# Patient Record
Sex: MALE | Race: WHITE | NOT HISPANIC OR LATINO | Employment: FULL TIME | URBAN - METROPOLITAN AREA
[De-identification: names, ages, dates, MRNs, and addresses within clinical notes are randomized per-mention and may not be internally consistent; named-entity substitution may affect disease eponyms.]

---

## 2020-12-23 ENCOUNTER — HISTORICAL (OUTPATIENT)
Dept: ADMINISTRATIVE | Facility: HOSPITAL | Age: 25
End: 2020-12-23

## 2020-12-23 LAB
ALBUMIN SERPL-MCNC: 4.8 G/DL (ref 4.1–5.2)
ALBUMIN/GLOB SERPL: 2.1 {RATIO} (ref 1.2–2.2)
ALP SERPL-CCNC: 72 IU/L (ref 39–117)
ALT SERPL-CCNC: 17 IU/L (ref 0–44)
AST SERPL-CCNC: 22 IU/L (ref 0–40)
BASOPHILS # BLD AUTO: 0.1 X10E3/UL (ref 0–0.2)
BASOPHILS NFR BLD AUTO: 2 %
BILIRUB SERPL-MCNC: 0.7 MG/DL (ref 0–1.2)
BUN SERPL-MCNC: 13 MG/DL (ref 6–20)
CALCIUM SERPL-MCNC: 9.7 MG/DL (ref 8.7–10.2)
CHLORIDE SERPL-SCNC: 103 MMOL/L (ref 96–106)
CHOLEST SERPL-MCNC: 135 MG/DL (ref 100–199)
CHOLEST/HDLC SERPL: 2.3 RATIO (ref 0–5)
CO2 SERPL-SCNC: 26 MMOL/L (ref 20–29)
CREAT SERPL-MCNC: 1.08 MG/DL (ref 0.76–1.27)
CREAT/UREA NIT SERPL: 12 (ref 9–20)
EOSINOPHIL # BLD AUTO: 0.2 X10E3/UL (ref 0–0.4)
EOSINOPHIL NFR BLD AUTO: 6 %
ERYTHROCYTE [DISTWIDTH] IN BLOOD BY AUTOMATED COUNT: 11.9 % (ref 11.6–15.4)
GLOBULIN SER-MCNC: 2.3 G/DL (ref 1.5–4.5)
GLUCOSE SERPL-MCNC: 87 MG/DL (ref 65–99)
HCT VFR BLD AUTO: 40.5 % (ref 37.5–51)
HDLC SERPL-MCNC: 59 MG/DL
HGB BLD-MCNC: 13.8 G/DL (ref 13–17.7)
LDLC SERPL CALC-MCNC: 69 MG/DL (ref 0–99)
LYMPHOCYTES # BLD AUTO: 1.4 X10E3/UL (ref 0.7–3.1)
LYMPHOCYTES NFR BLD AUTO: 42 %
MCH RBC QN AUTO: 29.2 PG (ref 26.6–33)
MCHC RBC AUTO-ENTMCNC: 34.1 G/DL (ref 31.5–35.7)
MCV RBC AUTO: 86 FL (ref 79–97)
MONOCYTES # BLD AUTO: 0.3 X10E3/UL (ref 0.1–0.9)
MONOCYTES NFR BLD AUTO: 8 %
NEUTROPHILS # BLD AUTO: 1.4 X10E3/UL (ref 1.4–7)
NEUTROPHILS NFR BLD AUTO: 42 %
PLATELET # BLD AUTO: 199 X10E3/UL (ref 150–450)
POTASSIUM SERPL-SCNC: 4.5 MMOL/L (ref 3.5–5.2)
PROT SERPL-MCNC: 7.1 G/DL (ref 6–8.5)
RBC # BLD AUTO: 4.72 X10(6)/MCL (ref 4.14–5.8)
SODIUM SERPL-SCNC: 142 MMOL/L (ref 134–144)
TRIGL SERPL-MCNC: 24 MG/DL (ref 0–149)
TSH SERPL-ACNC: 0.62 MIU/ML (ref 0.45–4.5)
VLDLC SERPL CALC-MCNC: 7 MG/DL (ref 5–40)
WBC # SPEC AUTO: 3.3 X10E3/UL (ref 3.4–10.8)

## 2021-12-22 ENCOUNTER — HISTORICAL (OUTPATIENT)
Dept: LAB | Facility: HOSPITAL | Age: 26
End: 2021-12-22

## 2021-12-22 LAB
ABS NEUT (OLG): 2.57 X10(3)/MCL (ref 2.1–9.2)
ALBUMIN SERPL-MCNC: 4.2 GM/DL (ref 3.5–5)
ALBUMIN/GLOB SERPL: 1.5 RATIO (ref 1.1–2)
ALP SERPL-CCNC: 72 UNIT/L (ref 40–150)
ALT SERPL-CCNC: 21 UNIT/L (ref 0–55)
AST SERPL-CCNC: 24 UNIT/L (ref 5–34)
BASOPHILS # BLD AUTO: 0 X10(3)/MCL (ref 0–0.2)
BASOPHILS NFR BLD AUTO: 1 %
BILIRUB SERPL-MCNC: 0.9 MG/DL
BILIRUBIN DIRECT+TOT PNL SERPL-MCNC: 0.4 MG/DL (ref 0–0.5)
BILIRUBIN DIRECT+TOT PNL SERPL-MCNC: 0.5 MG/DL (ref 0–0.8)
BUN SERPL-MCNC: 12.5 MG/DL (ref 8.9–20.6)
CALCIUM SERPL-MCNC: 9.8 MG/DL (ref 8.7–10.5)
CHLORIDE SERPL-SCNC: 103 MMOL/L (ref 98–107)
CHOLEST SERPL-MCNC: 159 MG/DL
CHOLEST/HDLC SERPL: 3 {RATIO} (ref 0–5)
CO2 SERPL-SCNC: 30 MMOL/L (ref 22–29)
CREAT SERPL-MCNC: 0.88 MG/DL (ref 0.73–1.18)
EOSINOPHIL # BLD AUTO: 0.5 X10(3)/MCL (ref 0–0.9)
EOSINOPHIL NFR BLD AUTO: 10 %
ERYTHROCYTE [DISTWIDTH] IN BLOOD BY AUTOMATED COUNT: 12.1 % (ref 11.5–17)
GLOBULIN SER-MCNC: 2.8 GM/DL (ref 2.4–3.5)
GLUCOSE SERPL-MCNC: 88 MG/DL (ref 74–100)
HCT VFR BLD AUTO: 43.7 % (ref 42–52)
HDLC SERPL-MCNC: 62 MG/DL (ref 35–60)
HGB BLD-MCNC: 14.5 GM/DL (ref 14–18)
IMM GRANULOCYTES # BLD AUTO: 0.01 % (ref 0–0.02)
IMM GRANULOCYTES NFR BLD AUTO: 0.2 % (ref 0–0.43)
LDLC SERPL CALC-MCNC: 87 MG/DL (ref 50–140)
LYMPHOCYTES # BLD AUTO: 1.7 X10(3)/MCL (ref 0.6–4.6)
LYMPHOCYTES NFR BLD AUTO: 33 %
MCH RBC QN AUTO: 28.6 PG (ref 27–31)
MCHC RBC AUTO-ENTMCNC: 33.2 GM/DL (ref 33–36)
MCV RBC AUTO: 86.2 FL (ref 80–94)
MONOCYTES # BLD AUTO: 0.3 X10(3)/MCL (ref 0.1–1.3)
MONOCYTES NFR BLD AUTO: 6 %
NEUTROPHILS # BLD AUTO: 2.57 X10(3)/MCL (ref 1.4–7.9)
NEUTROPHILS NFR BLD AUTO: 50 %
PLATELET # BLD AUTO: 230 X10(3)/MCL (ref 130–400)
PMV BLD AUTO: 10.5 FL (ref 9.4–12.4)
POTASSIUM SERPL-SCNC: 4 MMOL/L (ref 3.5–5.1)
PROT SERPL-MCNC: 7 GM/DL (ref 6.4–8.3)
RBC # BLD AUTO: 5.07 X10(6)/MCL (ref 4.7–6.1)
SODIUM SERPL-SCNC: 141 MMOL/L (ref 136–145)
TRIGL SERPL-MCNC: 52 MG/DL (ref 34–140)
TSH SERPL-ACNC: 0.84 UIU/ML (ref 0.35–4.94)
VLDLC SERPL CALC-MCNC: 10 MG/DL
WBC # SPEC AUTO: 5.2 X10(3)/MCL (ref 4.5–11.5)

## 2022-04-10 ENCOUNTER — HISTORICAL (OUTPATIENT)
Dept: ADMINISTRATIVE | Facility: HOSPITAL | Age: 27
End: 2022-04-10

## 2022-04-26 VITALS
OXYGEN SATURATION: 99 % | DIASTOLIC BLOOD PRESSURE: 77 MMHG | BODY MASS INDEX: 25.66 KG/M2 | HEIGHT: 70 IN | WEIGHT: 179.25 LBS | SYSTOLIC BLOOD PRESSURE: 127 MMHG

## 2022-05-03 NOTE — HISTORICAL [facility]ER
This is a historical note converted from [employer]. Formatting and pictures may have been removed.  Please reference [place] for original formatting and attached multimedia. Chief Complaint  Wellness  History of Present Illness  This is a 25-year-old white male who presents to clinic today to establish care. ?Patient has no significant past medical history and takes no daily medications.? Patient is now teaching at the primary school?and would like to have a wellness examination. ?No complaints today.? Patient?eats healthy, exercises regularly.  Review of Systems  Constitutional: Negative except as documented in history of present illness.?  Eye: Negative except as documented in history of present illness.?  Ear/Nose/Mouth/Throat: Negative except as documented in history of present illness.?  Respiratory: Negative except as documented in history of present illness.?  Cardiovascular: Negative except as documented in history of present illness.?  Gastrointestinal: Negative except as documented in history of present illness.?  Genitourinary: Negative except as documented in history of present illness.?  Hematology/Lymphatics: Negative except as documented in history of present illness.?  Endocrine: Negative except as documented in history of present illness.?  Immunologic: Negative except as documented in history of present illness.?  Musculoskeletal: Negative except as documented in history of present illness.?  Integumentary: Negative except as documented in history of present illness.?  Neurologic: Negative except as documented in history of present illness.?  Psychiatric: Negative except as documented in history of present illness.?  All other systems are negative  ?  Physical Exam  Vitals & Measurements  T:?36.3? ?C (Oral)? HR:?68(Peripheral)? BP:?105/62? SpO2:?98%?  HT:?178.00?cm? WT:?82.000?kg? BMI:?25.88?  General: Alert and oriented, No acute distress.?  Eye: Pupils are equal, round and reactive to light,  Extraocular movements are intact, Normal conjunctiva.?  HENT: Normocephalic, No damage to dentition, Tympanic membranes are clear, Good light reflex, Normal hearing, Oral mucosa is moist, No pharyngeal erythema, No sinus tenderness.?  Neck: Supple, Non-tender.?  Respiratory: Lungs are clear to auscultation, Respirations are non-labored, Breath sounds are equal, Symmetrical chest wall expansion.?  Cardiovascular: Normal rate, Regular rhythm, No murmur, No edema.?  Gastrointestinal: Soft, Non-tender, Non-distended, Normal bowel sounds.?  Musculoskeletal: Normal range of motion, Normal strength, No tenderness, No swelling, No deformity, Normal gait.?  Integumentary: Warm, Dry, Pink.?  Neurologic: Alert, Oriented, Normal sensory, Normal motor function, No focal deficits.?  Cognition and Speech: Oriented, Speech clear and coherent, Functional cognition intact.?  Psychiatric: Cooperative, Appropriate mood & affect, Normal judgment.  ?  Assessment/Plan  1.?Encounter to establish care?Z76.89  Ordered:  [facility] Est 18-39 years [id number] , Encounter to establish care  Annual physical exam, [facility], 12/23/20 10:35:00 CST  ?  2.?Annual physical exam?Z00.00  ?Labs today, will call with results.? Return to clinic in 1 year with labs prior.  Ordered:  CBC w/ Auto Diff, Routine collect, 12/23/20 10:33:00 CST, Blood, [facility], Stop date 12/23/20 10:34:00 CST, Lab Collect, Annual physical exam, 12/23/20 10:33:00 CST  CBC w/ Auto Diff, Routine collect, *Est. 12/23/21 3:00:00 CST, Blood, Order for future visit, *Est. Stop date 12/23/21 3:00:00 CST, Lab Collect, Annual physical exam, 12/23/20 10:34:00 CST  Clinic Follow-Up Wellness 30 Minutes, *Est. 12/28/21 9:00:00 CST, Order for future visit, Annual physical exam, [facility]  Comprehensive Metabolic Panel, Routine collect, *Est. 12/23/21 3:00:00 CST, Blood, Order for future visit, *Est. Stop date 12/23/21  3:00:00 CST, Lab Collect, Annual physical exam, 12/23/20 10:34:00 CST  Comprehensive Metabolic Panel, Routine collect, 12/23/20 10:33:00 CST, Blood, [facility], Stop date 12/23/20 10:34:00 CST, Lab Collect, Annual physical exam, 12/23/20 10:33:00 CST  Lipid Panel, Routine collect, 12/23/20 10:33:00 CST, Blood, [facility], Stop date 12/23/20 10:34:00 CST, Lab Collect, Annual physical exam, 12/23/20 10:33:00 CST  Lipid Panel, Routine collect, *Est. 12/23/21 3:00:00 CST, Blood, Order for future visit, *Est. Stop date 12/23/21 3:00:00 CST, Lab Collect, Annual physical exam, 12/23/20 10:34:00 CST  Preventative Health Care Est 18-39 years [id number] [facility], Encounter to establish care  Annual physical exam, [facility], 12/23/20 10:35:00 CST  Thyroid Stimulating Hormone, Routine collect, 12/23/20 10:33:00 CST, Blood, [facility], Stop date 12/23/20 10:34:00 CST, Lab Collect, Annual physical exam, 12/23/20 10:33:00 CST  Thyroid Stimulating Hormone, Routine collect, *Est. 12/23/21 3:00:00 CST, Blood, Order for future visit, *Est. Stop date 12/23/21 3:00:00 CST, Lab Collect, Annual physical exam, 12/23/20 10:34:00 CST  ?  Referrals  Clinic Follow-Up Wellness 30 Minutes, *Est. 12/28/21 9:00:00 CST, Order for future visit, Annual physical exam, [facility]   Problem List/Past Medical History  Ongoing  No chronic problems  Historical  No qualifying data  Medications  No active medications  Allergies  No Known Allergies  No Known Medication Allergies  Social History  Abuse/Neglect  No, 12/23/2020  Tobacco  Never (less than 100 in lifetime), No, 12/23/2020  Never smoker, 05/12/2015  Family History  Family history is negative  Immunizations  Vaccine Date Status   meningococcal conjugate vaccine 04/24/2014 Recorded   meningococcal conjugate vaccine 09/24/2009 Recorded   tetanus/diphtheria/pertussis, acel(Tdap) 06/29/2007 Recorded   poliovirus vaccine, live, trivalent 06/09/2000  Recorded   measles/mumps/rubella virus vaccine 06/09/2000 Recorded   measles/mumps/rubella virus vaccine 02/14/1997 Recorded   poliovirus vaccine, live, trivalent 1996 Recorded   hepatitis B pediatric vaccine 1996 Recorded   poliovirus vaccine, live, trivalent 1996 Recorded   poliovirus vaccine, live, trivalent 1996 Recorded   hepatitis B pediatric vaccine 1996 Recorded   hepatitis B pediatric vaccine 1996 Recorded   Health Maintenance  Health Maintenance  ???Pending?(in the next year)  ??? ??OverDue  ??? ? ? ?Tetanus Vaccine due??06/29/17??and every 10??year(s)  ??? ??Due In Future?  ??? ? ? ?Obesity Screening not due until??01/01/21??and every 1??year(s)  ??? ? ? ?Alcohol Misuse Screening not due until??01/02/21??and every 1??year(s)  ???Satisfied?(in the past 1 year)  ??? ??Satisfied?  ??? ? ? ?ADL Screening on??12/23/20.??Satisfied by Kasey Hernandez LPN  ??? ? ? ?Alcohol Misuse Screening on??12/23/20.??Satisfied by Kasey Hernandez LPN  ??? ? ? ?Blood Pressure Screening on??12/23/20.??Satisfied by Kasey Hernandez LPN  ??? ? ? ?Body Mass Index Check on??12/23/20.??Satisfied by Kasey Hernandez LPN  ??? ? ? ?Depression Screening on??12/23/20.??Satisfied by David STANLEY Kasey  ??? ? ? ?Influenza Vaccine on??12/23/20.??Satisfied by Kasey Hernandez LPN  ??? ? ? ?Obesity Screening on??12/23/20.??Satisfied by David STANLEY Kasey  ?

## 2022-12-27 ENCOUNTER — TELEPHONE (OUTPATIENT)
Dept: FAMILY MEDICINE | Facility: CLINIC | Age: 27
End: 2022-12-27

## 2022-12-27 DIAGNOSIS — Z00.00 ANNUAL PHYSICAL EXAM: Primary | ICD-10-CM

## 2022-12-27 DIAGNOSIS — Z11.4 SCREENING FOR HIV (HUMAN IMMUNODEFICIENCY VIRUS): ICD-10-CM

## 2022-12-27 DIAGNOSIS — Z11.59 NEED FOR HEPATITIS C SCREENING TEST: ICD-10-CM

## 2022-12-27 PROBLEM — R00.1 BRADYCARDIA: Status: ACTIVE | Noted: 2022-12-27

## 2022-12-27 NOTE — TELEPHONE ENCOUNTER
No answer LVM on 12/27/2022 @ 9:40 reminding pt of appt and labs needed.       Please order wellness labs.

## 2023-01-04 ENCOUNTER — LAB VISIT (OUTPATIENT)
Dept: LAB | Facility: HOSPITAL | Age: 28
End: 2023-01-04
Attending: NURSE PRACTITIONER
Payer: COMMERCIAL

## 2023-01-04 DIAGNOSIS — Z00.00 ANNUAL PHYSICAL EXAM: ICD-10-CM

## 2023-01-04 DIAGNOSIS — Z11.59 NEED FOR HEPATITIS C SCREENING TEST: ICD-10-CM

## 2023-01-04 DIAGNOSIS — Z11.4 SCREENING FOR HIV (HUMAN IMMUNODEFICIENCY VIRUS): ICD-10-CM

## 2023-01-04 LAB
ALBUMIN SERPL-MCNC: 4.5 G/DL (ref 3.5–5)
ALBUMIN/GLOB SERPL: 1.7 RATIO (ref 1.1–2)
ALP SERPL-CCNC: 64 UNIT/L (ref 40–150)
ALT SERPL-CCNC: 20 UNIT/L (ref 0–55)
AST SERPL-CCNC: 25 UNIT/L (ref 5–34)
BASOPHILS # BLD AUTO: 0.05 X10(3)/MCL (ref 0–0.2)
BASOPHILS NFR BLD AUTO: 0.7 %
BILIRUBIN DIRECT+TOT PNL SERPL-MCNC: 1 MG/DL
BUN SERPL-MCNC: 15.9 MG/DL (ref 8.9–20.6)
CALCIUM SERPL-MCNC: 9.9 MG/DL (ref 8.4–10.2)
CHLORIDE SERPL-SCNC: 101 MMOL/L (ref 98–107)
CHOLEST SERPL-MCNC: 177 MG/DL
CHOLEST/HDLC SERPL: 2 {RATIO} (ref 0–5)
CO2 SERPL-SCNC: 28 MMOL/L (ref 22–29)
CREAT SERPL-MCNC: 0.93 MG/DL (ref 0.73–1.18)
EOSINOPHIL # BLD AUTO: 0.24 X10(3)/MCL (ref 0–0.9)
EOSINOPHIL NFR BLD AUTO: 3.2 %
ERYTHROCYTE [DISTWIDTH] IN BLOOD BY AUTOMATED COUNT: 11.9 % (ref 11.6–14.4)
GFR SERPLBLD CREATININE-BSD FMLA CKD-EPI: >60 MLS/MIN/1.73/M2
GLOBULIN SER-MCNC: 2.6 GM/DL (ref 2.4–3.5)
GLUCOSE SERPL-MCNC: 78 MG/DL (ref 74–100)
HCT VFR BLD AUTO: 43.4 % (ref 42–52)
HCV AB SERPL QL IA: NONREACTIVE
HDLC SERPL-MCNC: 74 MG/DL (ref 35–60)
HGB BLD-MCNC: 14.4 GM/DL (ref 14–18)
HIV 1+2 AB+HIV1 P24 AG SERPL QL IA: NONREACTIVE
IMM GRANULOCYTES # BLD AUTO: 0.01 X10(3)/MCL (ref 0–0.04)
IMM GRANULOCYTES NFR BLD AUTO: 0.1 %
LDLC SERPL CALC-MCNC: 97 MG/DL (ref 50–140)
LYMPHOCYTES # BLD AUTO: 1.3 X10(3)/MCL (ref 0.6–4.6)
LYMPHOCYTES NFR BLD AUTO: 17.4 %
MCH RBC QN AUTO: 28.5 PG
MCHC RBC AUTO-ENTMCNC: 33.2 MG/DL (ref 33–36)
MCV RBC AUTO: 85.8 FL (ref 80–94)
MONOCYTES # BLD AUTO: 0.31 X10(3)/MCL (ref 0.1–1.3)
MONOCYTES NFR BLD AUTO: 4.1 %
NEUTROPHILS # BLD AUTO: 5.57 X10(3)/MCL (ref 2.1–9.2)
NEUTROPHILS NFR BLD AUTO: 74.5 %
PLATELET # BLD AUTO: 233 X10(3)/MCL (ref 140–371)
PMV BLD AUTO: 10 FL (ref 9.4–12.4)
POTASSIUM SERPL-SCNC: 3.7 MMOL/L (ref 3.5–5.1)
PROT SERPL-MCNC: 7.1 GM/DL (ref 6.4–8.3)
RBC # BLD AUTO: 5.06 X10(6)/MCL (ref 4.7–6.1)
SODIUM SERPL-SCNC: 138 MMOL/L (ref 136–145)
TRIGL SERPL-MCNC: 30 MG/DL (ref 34–140)
TSH SERPL-ACNC: 0.67 UIU/ML (ref 0.35–4.94)
VLDLC SERPL CALC-MCNC: 6 MG/DL
WBC # SPEC AUTO: 7.5 X10(3)/MCL (ref 4.5–11.5)

## 2023-01-04 PROCEDURE — 87389 HIV-1 AG W/HIV-1&-2 AB AG IA: CPT

## 2023-01-04 PROCEDURE — 85025 COMPLETE CBC W/AUTO DIFF WBC: CPT

## 2023-01-04 PROCEDURE — 80053 COMPREHEN METABOLIC PANEL: CPT

## 2023-01-04 PROCEDURE — 86803 HEPATITIS C AB TEST: CPT

## 2023-01-04 PROCEDURE — 84443 ASSAY THYROID STIM HORMONE: CPT

## 2023-01-04 PROCEDURE — 36415 COLL VENOUS BLD VENIPUNCTURE: CPT

## 2023-01-04 PROCEDURE — 80061 LIPID PANEL: CPT

## 2023-01-12 ENCOUNTER — OFFICE VISIT (OUTPATIENT)
Dept: FAMILY MEDICINE | Facility: CLINIC | Age: 28
End: 2023-01-12
Payer: COMMERCIAL

## 2023-01-12 VITALS
RESPIRATION RATE: 16 BRPM | BODY MASS INDEX: 24.91 KG/M2 | DIASTOLIC BLOOD PRESSURE: 74 MMHG | WEIGHT: 174 LBS | OXYGEN SATURATION: 99 % | TEMPERATURE: 99 F | SYSTOLIC BLOOD PRESSURE: 118 MMHG | HEIGHT: 70 IN | HEART RATE: 50 BPM

## 2023-01-12 DIAGNOSIS — Z11.59 NEED FOR HEPATITIS C SCREENING TEST: ICD-10-CM

## 2023-01-12 DIAGNOSIS — Z00.00 ANNUAL PHYSICAL EXAM: Primary | ICD-10-CM

## 2023-01-12 DIAGNOSIS — Z11.4 SCREENING FOR HIV (HUMAN IMMUNODEFICIENCY VIRUS): ICD-10-CM

## 2023-01-12 PROCEDURE — 1159F PR MEDICATION LIST DOCUMENTED IN MEDICAL RECORD: ICD-10-PCS | Mod: CPTII,,, | Performed by: NURSE PRACTITIONER

## 2023-01-12 PROCEDURE — 99395 PR PREVENTIVE VISIT,EST,18-39: ICD-10-PCS | Mod: ,,, | Performed by: NURSE PRACTITIONER

## 2023-01-12 PROCEDURE — 3078F DIAST BP <80 MM HG: CPT | Mod: CPTII,,, | Performed by: NURSE PRACTITIONER

## 2023-01-12 PROCEDURE — 1160F RVW MEDS BY RX/DR IN RCRD: CPT | Mod: CPTII,,, | Performed by: NURSE PRACTITIONER

## 2023-01-12 PROCEDURE — 3008F BODY MASS INDEX DOCD: CPT | Mod: CPTII,,, | Performed by: NURSE PRACTITIONER

## 2023-01-12 PROCEDURE — 3008F PR BODY MASS INDEX (BMI) DOCUMENTED: ICD-10-PCS | Mod: CPTII,,, | Performed by: NURSE PRACTITIONER

## 2023-01-12 PROCEDURE — 99395 PREV VISIT EST AGE 18-39: CPT | Mod: ,,, | Performed by: NURSE PRACTITIONER

## 2023-01-12 PROCEDURE — 1160F PR REVIEW ALL MEDS BY PRESCRIBER/CLIN PHARMACIST DOCUMENTED: ICD-10-PCS | Mod: CPTII,,, | Performed by: NURSE PRACTITIONER

## 2023-01-12 PROCEDURE — 3074F SYST BP LT 130 MM HG: CPT | Mod: CPTII,,, | Performed by: NURSE PRACTITIONER

## 2023-01-12 PROCEDURE — 3078F PR MOST RECENT DIASTOLIC BLOOD PRESSURE < 80 MM HG: ICD-10-PCS | Mod: CPTII,,, | Performed by: NURSE PRACTITIONER

## 2023-01-12 PROCEDURE — 3074F PR MOST RECENT SYSTOLIC BLOOD PRESSURE < 130 MM HG: ICD-10-PCS | Mod: CPTII,,, | Performed by: NURSE PRACTITIONER

## 2023-01-12 PROCEDURE — 1159F MED LIST DOCD IN RCRD: CPT | Mod: CPTII,,, | Performed by: NURSE PRACTITIONER

## 2023-01-12 NOTE — PROGRESS NOTES
"Subjective:       Patient ID: Jerald Davalos is a 28 y.o. male.    Chief Complaint: Annual Exam      HPI   This is a 28-year-old white male who presents to clinic today for an annual wellness exam.  Patient has no significant past medical history and takes no daily medication.  Patient states overall he is doing well.  No complaints today.    Review of Systems  Comprehensive review of systems negative except as stated in HPI    The patient's Health Maintenance was reviewed and the following appears to be due:   There are no preventive care reminders to display for this patient.    Past Medical History:  Past Medical History:   Diagnosis Date    Bradycardia      History reviewed. No pertinent surgical history.  Review of patient's allergies indicates:  No Known Allergies  No current outpatient medications on file prior to visit.     No current facility-administered medications on file prior to visit.     Social History     Socioeconomic History    Marital status: Single   Tobacco Use    Smoking status: Never    Smokeless tobacco: Never   Substance and Sexual Activity    Alcohol use: Yes     Comment: occasional    Drug use: Not Currently     Family History   Problem Relation Age of Onset    No Known Problems Mother     No Known Problems Father        Objective:       /74 (BP Location: Left arm)   Pulse (!) 50   Temp 98.7 °F (37.1 °C) (Oral)   Resp 16   Ht 5' 10" (1.778 m)   Wt 78.9 kg (174 lb)   SpO2 99%   BMI 24.97 kg/m²      Physical Exam  Vitals and nursing note reviewed.   Constitutional:       Appearance: Normal appearance. He is normal weight.   HENT:      Head: Normocephalic and atraumatic.      Right Ear: Tympanic membrane, ear canal and external ear normal.      Left Ear: Tympanic membrane, ear canal and external ear normal.      Nose: Nose normal.      Mouth/Throat:      Mouth: Mucous membranes are moist.      Pharynx: Oropharynx is clear.   Eyes:      Extraocular Movements: Extraocular " movements intact.      Conjunctiva/sclera: Conjunctivae normal.      Pupils: Pupils are equal, round, and reactive to light.   Cardiovascular:      Rate and Rhythm: Regular rhythm. Bradycardia present.      Pulses: Normal pulses.      Heart sounds: Normal heart sounds.   Pulmonary:      Effort: Pulmonary effort is normal.      Breath sounds: Normal breath sounds.   Abdominal:      General: Abdomen is flat. Bowel sounds are normal.      Palpations: Abdomen is soft.   Musculoskeletal:         General: Normal range of motion.      Cervical back: Normal range of motion and neck supple.   Skin:     General: Skin is warm and dry.   Neurological:      General: No focal deficit present.      Mental Status: He is alert and oriented to person, place, and time.   Psychiatric:         Mood and Affect: Mood normal.         Behavior: Behavior normal.         Thought Content: Thought content normal.         Judgment: Judgment normal.       Labs  Lab Visit on 01/04/2023   Component Date Value Ref Range Status    Sodium Level 01/04/2023 138  136 - 145 mmol/L Final    Potassium Level 01/04/2023 3.7  3.5 - 5.1 mmol/L Final    Chloride 01/04/2023 101  98 - 107 mmol/L Final    Carbon Dioxide 01/04/2023 28  22 - 29 mmol/L Final    Glucose Level 01/04/2023 78  74 - 100 mg/dL Final    Blood Urea Nitrogen 01/04/2023 15.9  8.9 - 20.6 mg/dL Final    Creatinine 01/04/2023 0.93  0.73 - 1.18 mg/dL Final    Calcium Level Total 01/04/2023 9.9  8.4 - 10.2 mg/dL Final    Protein Total 01/04/2023 7.1  6.4 - 8.3 gm/dL Final    Albumin Level 01/04/2023 4.5  3.5 - 5.0 g/dL Final    Globulin 01/04/2023 2.6  2.4 - 3.5 gm/dL Final    Albumin/Globulin Ratio 01/04/2023 1.7  1.1 - 2.0 ratio Final    Bilirubin Total 01/04/2023 1.0  <=1.5 mg/dL Final    Alkaline Phosphatase 01/04/2023 64  40 - 150 unit/L Final    Alanine Aminotransferase 01/04/2023 20  0 - 55 unit/L Final    Aspartate Aminotransferase 01/04/2023 25  5 - 34 unit/L Final    eGFR 01/04/2023 >60   mls/min/1.73/m2 Final    Cholesterol Total 01/04/2023 177  <=200 mg/dL Final    HDL Cholesterol 01/04/2023 74 (H)  35 - 60 mg/dL Final    Triglyceride 01/04/2023 30 (L)  34 - 140 mg/dL Final    Cholesterol/HDL Ratio 01/04/2023 2  0 - 5 Final    Very Low Density Lipoprotein 01/04/2023 6   Final    LDL Cholesterol 01/04/2023 97.00  50.00 - 140.00 mg/dL Final    Thyroid Stimulating Hormone 01/04/2023 0.670  0.350 - 4.940 uIU/mL Final    Hepatitis C Antibody 01/04/2023 Nonreactive  Nonreactive Final    HIV 01/04/2023 Nonreactive  Nonreactive Final    WBC 01/04/2023 7.5  4.5 - 11.5 x10(3)/mcL Final    RBC 01/04/2023 5.06  4.70 - 6.10 x10(6)/mcL Final    Hgb 01/04/2023 14.4  14.0 - 18.0 gm/dL Final    Hct 01/04/2023 43.4  42.0 - 52.0 % Final    MCV 01/04/2023 85.8  80.0 - 94.0 fL Final    MCH 01/04/2023 28.5  pg Final    MCHC 01/04/2023 33.2  33.0 - 36.0 mg/dL Final    RDW 01/04/2023 11.9  11.6 - 14.4 % Final    Platelet 01/04/2023 233  140 - 371 x10(3)/mcL Final    MPV 01/04/2023 10.0  9.4 - 12.4 fL Final    Neut % 01/04/2023 74.5  % Final    Lymph % 01/04/2023 17.4  % Final    Mono % 01/04/2023 4.1  % Final    Eos % 01/04/2023 3.2  % Final    Basophil % 01/04/2023 0.7  % Final    Lymph # 01/04/2023 1.30  0.6 - 4.6 x10(3)/mcL Final    Neut # 01/04/2023 5.57  2.1 - 9.2 x10(3)/mcL Final    Mono # 01/04/2023 0.31  0.1 - 1.3 x10(3)/mcL Final    Eos # 01/04/2023 0.24  0 - 0.9 x10(3)/mcL Final    Baso # 01/04/2023 0.05  0 - 0.2 x10(3)/mcL Final    IG# 01/04/2023 0.01  0 - 0.04 x10(3)/mcL Final    IG% 01/04/2023 0.1  % Final       Assessment and Plan       ICD-10-CM ICD-9-CM   1. Annual physical exam  Z00.00 V70.0   2. Need for hepatitis C screening test  Z11.59 V73.89   3. Screening for HIV (human immunodeficiency virus)  Z11.4 V73.89        1. Annual physical exam  Overview:  Annual exam yearly in January    Assessment & Plan:  Declines Boostrix vaccine.  Labs reviewed in detail with patient, will repeat in 1  year.    Orders:  -     CBC Auto Differential; Future; Expected date: 01/12/2024  -     Comprehensive Metabolic Panel; Future; Expected date: 01/12/2024  -     Lipid Panel; Future; Expected date: 01/12/2024  -     TSH; Future; Expected date: 01/12/2024  -     Hemoglobin A1C; Future; Expected date: 01/12/2024    2. Need for hepatitis C screening test  Comments:  Nonreactive    3. Screening for HIV (human immunodeficiency virus)  Comments:  Nonreactive           Follow up in about 1 year (around 1/12/2024) for Annual.

## 2023-04-17 PROBLEM — Z00.00 ANNUAL PHYSICAL EXAM: Status: RESOLVED | Noted: 2022-12-27 | Resolved: 2023-04-17

## 2024-01-10 ENCOUNTER — TELEPHONE (OUTPATIENT)
Dept: FAMILY MEDICINE | Facility: CLINIC | Age: 29
End: 2024-01-10
Payer: COMMERCIAL

## 2024-01-10 NOTE — TELEPHONE ENCOUNTER
Are there any outstanding tasks in patient's chart?  labs    2. Do we have outstanding/pending referrals?  n    3. Has the patient been seen in an ER, Urgent Care, or admitted since last visit?  n    4. Has patient seen any other health care providers since last visit?  n    5.  Has patient had any blood work or x-rays done since last visit?   Patient will complete labs at [facility]